# Patient Record
Sex: FEMALE | Race: WHITE
[De-identification: names, ages, dates, MRNs, and addresses within clinical notes are randomized per-mention and may not be internally consistent; named-entity substitution may affect disease eponyms.]

---

## 2018-11-28 NOTE — UC
Lower Extremity/Ankle HPI





- HPI Summary


HPI Summary: 





27 yo female presents with left 4th toe pain. She tells me that about 20min PTA 

she slipped in her kitchen at home and her toes wedged between the leg of her 

kitchen table. She had immediate pain and her left 4th toe is deformed. She has 

not taken anything for her discomfort. She is ambulatory with use of crutches.





- History of Current Complaint


Stated Complaint: TOE INJURY


Time Seen by Provider: 11/28/18 17:22


Hx Obtained From: Patient


Hx Last Menstrual Period: IUD


Severity Initially: Severe


Severity Currently: Severe


Pain Intensity: 10


Pain Scale Used: 0-10 Numeric





- Allergies/Home Medications


Allergies/Adverse Reactions: 


 Allergies











Allergy/AdvReac Type Severity Reaction Status Date / Time


 


erythromycin base Allergy  Hives Verified 09/02/18 08:22














PMH/Surg Hx/FS Hx/Imm Hx





- Additional Past Medical History


Additional PMH: 





None


Other History Of: 


   Negative For: HIV, Hepatitis B, Hepatitis C, Anticoagulant Therapy





- Surgical History


Surgical History: Yes


Surgery Procedure, Year, and Place: removal of ovary r/t cyst age 11.  c-

section x2





- Family History


Known Family History: Positive: Cardiac Disease - pt states father has arrythmia

, Diabetes


   Negative: Hypertension, Other - negative diverticulitis. 





- Social History


Occupation: Employed Full-time


Lives: With Family


Alcohol Use: None


Substance Use Type: None


Smoking Status (MU): Heavy Every Day Tobacco Smoker


Type: Cigarettes


Amount Used/How Often: 1/2 PPD


Have You Smoked in the Last Year: Yes





- Immunization History


Most Recent Influenza Vaccination: 03/2017





Review of Systems


All Other Systems Reviewed And Are Negative: Yes


Constitutional: Positive: Negative


Skin: Positive: Negative


Respiratory: Positive: Negative


Cardiovascular: Positive: Negative


Neurovascular: Positive: Negative


Musculoskeletal: Positive: Other: - left 4th toe injury


Neurological: Positive: Negative


Psychological: Positive: Negative





Physical Exam





- Summary


Physical Exam Summary: 





GENERAL: NAD. WDWN. No pain distress.


SKIN: No rashes, sores, lesions, or open wounds.


CHEST:  No accessory muscle use. Breathing comfortably and in no distress.


CV:  Pulses intact PT and DP. Cap refill <2seconds


MSK: LEFT 4th toe with obvious lateral angulation. Severe TTP about whole toe.


NEURO: Alert. Sensations intact and symmetric B/L LEs


PSYCH: Age appropriate behavior.


Triage Information Reviewed: Yes


Vital Signs: 





 Vital Signs (72 hours)











  11/28/18





  17:31


 


Temperature 100.2 F


 


Pulse Rate 89


 


Respiratory 18





Rate 


 


Blood Pressure 143/94





(mmHg) 


 


O2 Sat by Pulse 99





Oximetry 











Vital Signs Reviewed: Yes





Procedures





- Joint Reduction


  ** Left


Joint Reduction Site: other - 4th toe


Specify Other Joint Reduced: 4th toe


Conscious Sedation: No


Reduction Attempts: 1


Pre-Procedure NV Exam: Yes





Lower Extremity Course/Dx





- Course


Course Of Treatment: Pt was given norco in the clinic for her discomfort. XR: 

REPORT AND IMPRESSION:  #. Proximal metaphyseal fracture of the fourth proximal 

phalanx with approximate 30 degrees apex plantar angulation without significant 

displacement. Associated fusiform soft tissue swelling. Normal articular 

alignment.  Discussed with the pt that I wish to reduce her toe and logan tape 

it. She was agreeable to this. The procedure was explained to the pt and all 

questions were answered. A time out was performed, witnessed, and signed. The 

area was prepped with an alcohol pad. A 25g needle and 0.5mL 2% lidocaine 

without epi was administered as a digital block on the medial and lateral 

aspect of the 4th toe dorsal MTP and good anesthetization was achieved. 

Longitudinal traction was applied and the toe moved into correct anatomical 

alignment and logan taped to the 3rd toe. Pt tolerated well. She was fitted for 

a post-op shoe and advised to RICE, use her crutches, and f/u with Orthopedics.





- Differential Dx/Diagnosis


Provider Diagnosis: 


 Fracture of fourth toe, left, closed








Discharge





- Sign-Out/Discharge


Documenting (check all that apply): Patient Departure


All imaging exams completed and their final reports reviewed: Yes





- Discharge Plan


Condition: Stable


Disposition: HOME


Patient Education Materials:  Toe Fracture (ED)


Forms:  *Work Release


Referrals: 


Vick Gunn MD [Primary Care Provider] - 


Da Huerta MD [Medical Doctor] - As Soon As Possible


Additional Instructions: 


If you develop a fever, shortness of breath, chest pain, new or worsening 

symptoms - please call your PCP or go to the ED.


 





Your blood pressure was high at todays visit. Please see your primary provider 

within 4 weeks for recheck and re-evaluation.





1) Rest, Ice, and elevate your foot as much as possible


2) Use the post-op shoe and crutches to be non weight bearing


3) Please call Orthopedics at the number below to schedule a follow up 

appointment as soon as possible





- Billing Disposition and Condition


Condition: STABLE


Disposition: Home

## 2019-07-03 ENCOUNTER — HOSPITAL ENCOUNTER (EMERGENCY)
Dept: HOSPITAL 25 - ED | Age: 29
Discharge: HOME | End: 2019-07-03
Payer: COMMERCIAL

## 2019-07-03 VITALS — DIASTOLIC BLOOD PRESSURE: 66 MMHG | SYSTOLIC BLOOD PRESSURE: 119 MMHG

## 2019-07-03 DIAGNOSIS — F17.210: ICD-10-CM

## 2019-07-03 DIAGNOSIS — N93.8: Primary | ICD-10-CM

## 2019-07-03 DIAGNOSIS — R10.9: ICD-10-CM

## 2019-07-03 DIAGNOSIS — R11.0: ICD-10-CM

## 2019-07-03 LAB
ALBUMIN SERPL BCG-MCNC: 4.4 G/DL (ref 3.2–5.2)
ALBUMIN/GLOB SERPL: 1.5 {RATIO} (ref 1–3)
ALP SERPL-CCNC: 55 U/L (ref 34–104)
ALT SERPL W P-5'-P-CCNC: 15 U/L (ref 7–52)
ANION GAP SERPL CALC-SCNC: 7 MMOL/L (ref 2–11)
AST SERPL-CCNC: 15 U/L (ref 13–39)
BASOPHILS # BLD AUTO: 0 10^3/UL (ref 0–0.2)
BUN SERPL-MCNC: 15 MG/DL (ref 6–24)
BUN/CREAT SERPL: 16.9 (ref 8–20)
CALCIUM SERPL-MCNC: 9.5 MG/DL (ref 8.6–10.3)
CHLORIDE SERPL-SCNC: 106 MMOL/L (ref 101–111)
EOSINOPHIL # BLD AUTO: 0.4 10^3/UL (ref 0–0.6)
GLOBULIN SER CALC-MCNC: 2.9 G/DL (ref 2–4)
GLUCOSE SERPL-MCNC: 76 MG/DL (ref 70–100)
HCG SERPL QL: < 0.6 MIU/ML
HCO3 SERPL-SCNC: 25 MMOL/L (ref 22–32)
HCT VFR BLD AUTO: 41 % (ref 35–47)
HGB BLD-MCNC: 14 G/DL (ref 12–16)
LYMPHOCYTES # BLD AUTO: 3.5 10^3/UL (ref 1–4.8)
MCH RBC QN AUTO: 30 PG (ref 27–31)
MCHC RBC AUTO-ENTMCNC: 34 G/DL (ref 31–36)
MCV RBC AUTO: 87 FL (ref 80–97)
MONOCYTES # BLD AUTO: 0.7 10^3/UL (ref 0–0.8)
NEUTROPHILS # BLD AUTO: 4.8 10^3/UL (ref 1.5–7.7)
NRBC # BLD AUTO: 0 10^3/UL
NRBC BLD QL AUTO: 0.1
PLATELET # BLD AUTO: 262 10^3/UL (ref 150–450)
POTASSIUM SERPL-SCNC: 4.1 MMOL/L (ref 3.5–5)
PROT SERPL-MCNC: 7.3 G/DL (ref 6.4–8.9)
RBC # BLD AUTO: 4.75 10^6 /UL (ref 3.7–4.87)
SODIUM SERPL-SCNC: 138 MMOL/L (ref 135–145)
WBC # BLD AUTO: 9.5 10^3/UL (ref 3.5–10.8)

## 2019-07-03 PROCEDURE — 36415 COLL VENOUS BLD VENIPUNCTURE: CPT

## 2019-07-03 PROCEDURE — 86901 BLOOD TYPING SEROLOGIC RH(D): CPT

## 2019-07-03 PROCEDURE — 99282 EMERGENCY DEPT VISIT SF MDM: CPT

## 2019-07-03 PROCEDURE — 84702 CHORIONIC GONADOTROPIN TEST: CPT

## 2019-07-03 PROCEDURE — 86900 BLOOD TYPING SEROLOGIC ABO: CPT

## 2019-07-03 PROCEDURE — 85025 COMPLETE CBC W/AUTO DIFF WBC: CPT

## 2019-07-03 PROCEDURE — 80053 COMPREHEN METABOLIC PANEL: CPT

## 2019-07-03 PROCEDURE — 86850 RBC ANTIBODY SCREEN: CPT

## 2019-07-03 NOTE — ED
GI/ HPI





- HPI Summary


HPI Summary: 


29 year old  female presents with vaginal bleeding today.  She states 

that period was 8 days late.  states had this much bleeding when had 

miscarriage.  States she's been having heavy bleeding for the past day.  She 

admits to nausea but no vomiting.  No dizziness.  No palpitations.  states she 

is soaking a tampon every hour.  No urinary symptoms.  No fevers.  





- History of Current Complaint


Chief Complaint: EDVaginalBleeding


Time Seen by Provider: 19 22:41


Stated Complaint: HEAVY BLEEDING PER PT


Hx Last Menstrual Period: IUD


Pain Intensity: 8





- Allergy/Home Medications


Allergies/Adverse Reactions: 


 Allergies











Allergy/AdvReac Type Severity Reaction Status Date / Time


 


erythromycin base Allergy  Hives Verified 18 08:22














PMH/Surg Hx/FS Hx/Imm Hx


Endocrine/Hematology History: 


   Denies: Hx Anticoagulant Therapy, Hx Diabetes, Hx Thyroid Disease


Cardiovascular History: 


   Denies: Hx Congestive Heart Failure, Hx Deep Vein Thrombosis, Hx Hypertension

, Hx Myocardial Infarction, Hx Pacemaker/ICD


Respiratory History: Reports: Hx Asthma


   Denies: Hx Chronic Obstructive Pulmonary Disease (COPD), Hx Lung Cancer, Hx 

Pneumonia, Hx Pulmonary Embolism


GI History: 


   Denies: Hx Gall Bladder Disease, Hx Gastrointestinal Bleed, Hx Ulcer, Hx 

Urosepsis


 History: 


   Denies: Hx Kidney Stones, Hx Renal Disease


Neurological History: 


   Denies: Hx Dementia, Hx Migraine, Hx Seizures, Hx Transient Ischemic Attacks 

(TIA)


Psychiatric History: 


   Denies: Hx Anxiety, Hx Depression, Hx Schizophrenia, Hx Bipolar Disorder





- Surgical History


Surgery Procedure, Year, and Place: removal of ovary r/t cyst age 11.  c-

section x2


Infectious Disease History: No


Infectious Disease History: 


   Denies: Hx Clostridium Difficile, Hx Hepatitis, Hx Human Immunodeficiency 

Virus (HIV), Hx of Known/Suspected MRSA, Hx Shingles, Hx Tuberculosis, Hx Known/

Suspected VRE, Hx Known/Suspected VRSA, History Other Infectious Disease, 

Traveled Outside the [place] in Last 30 Days





- Family History


Known Family History: Positive: Cardiac Disease - pt states father has arrythmia

, Diabetes


   Negative: Hypertension, Other - negative diverticulitis. 





- Social History


Alcohol Use: None


Substance Use Type: Reports: None


Smoking Status (MU): Heavy Every Day Tobacco Smoker


Type: Cigarettes


Amount Used/How Often: 1/2 PPD


Have You Smoked in the Last Year: Yes





Review of Systems


Negative: Fever


Negative: Chest Pain


Negative: Shortness Of Breath


Positive: Abdominal Pain, Nausea.  Negative: Vomiting, Diarrhea


All Other Systems Reviewed And Are Negative: Yes





Physical Exam


Triage Information Reviewed: Yes


Vital Signs On Initial Exam: 


 Initial Vitals











Temp Pulse Resp BP Pulse Ox


 


 98 F   73   18   131/87   96 


 


 19 22:32  19 22:32  19 22:32  19 22:32  19 22:32











Vital Signs Reviewed: Yes


Appearance: Positive: Well-Appearing


Skin: Positive: Warm, Dry


Head/Face: Positive: Normal Head/Face Inspection


Eyes: Positive: Normal, Conjunctiva Clear


ENT: Positive: Pharynx normal


Respiratory/Lung Sounds: Positive: Clear to Auscultation, Breath Sounds Present


Cardiovascular: Positive: Normal, RRR


Abdomen Description: Positive: Soft, Other: - lower abd tenderness


Bowel Sounds: Positive: Present


Musculoskeletal: Positive: Normal


Neurological: Positive: Normal


Psychiatric: Positive: Normal





Diagnostics





- Vital Signs


 Vital Signs











  Temp Pulse Resp BP Pulse Ox


 


 19 22:32  98 F  73  18  131/87  96














- Laboratory


Lab Results: 


 Lab Results











  19 Range/Units





  22:47 22:47 


 


WBC  9.5   (3.5-10.8)  10^3/uL


 


RBC  4.75   (3.70-4.87)  10^6 /uL


 


Hgb  14.0   (12.0-16.0)  g/dL


 


Hct  41   (35-47)  %


 


MCV  87   (80-97)  fL


 


MCH  30   (27-31)  pg


 


MCHC  34   (31-36)  g/dL


 


RDW  13   (10-15)  %


 


Plt Count  262   (150-450)  10^3/uL


 


MPV  6.9 L   (7.4-10.4)  fL


 


Neut % (Auto)  50.7   %


 


Lymph % (Auto)  37.3   %


 


Mono % (Auto)  7.1   %


 


Eos % (Auto)  4.4   %


 


Baso % (Auto)  0.5   %


 


Absolute Neuts (auto)  4.8   (1.5-7.7)  10^3/ul


 


Absolute Lymphs (auto)  3.5   (1.0-4.8)  10^3/ul


 


Absolute Monos (auto)  0.7   (0-0.8)  10^3/ul


 


Absolute Eos (auto)  0.4   (0-0.6)  10^3/ul


 


Absolute Basos (auto)  0.0   (0-0.2)  10^3/ul


 


Absolute Nucleated RBC  0.0   10^3/ul


 


Nucleated RBC %  0.1   


 


Blood Type   A Positive  


 


Antibody Screen   Pending  











Result Diagrams: 


 19 22:47





 19 22:47


Lab Statement: Any lab studies that have been ordered have been reviewed, and 

results considered in the medical decision making process.





Re-Evaluation





- Re-Evaluation


  ** First Eval


Re-Evaluation Time: 23:26


Comment: discussed results





GIGU Course/Dx





- Course


Course Of Treatment: 29 year old  female presents with vaginal bleeding 

today.  She states that period was 8 days late.  states had this much bleeding 

when had miscarriage.  States she's been having heavy bleeding for the past 

day.  She admits to nausea but no vomiting.  No dizziness.  No palpitations.  

states she is soaking a tampon every hour.  No urinary symptoms.  No fevers.  

On exam has tenderness in pelvic area. wbc normal. h/h normal. hcg normal. 

discussed that patient is traying to conceive and just got off mirena so can 

have abnormal periods. will not restart birth control. patient understand and 

agrees with plan.





- Diagnoses


Differential Diagnoses - Female: Urinary Tract Infection, Other - pregnancy, 

miscarriage


Provider Diagnoses: 


 Dysfunctional uterine bleeding








Discharge





- Sign-Out/Discharge


Documenting (check all that apply): Patient Departure


Patient Received Moderate/Deep Sedation with Procedure: No





- Discharge Plan


Condition: Good


Disposition: HOME


Patient Education Materials:  Dysfunctional Uterine Bleeding (ED)


Referrals: 


Vick Gunn MD [Primary Care Provider] - 


Additional Instructions: 


take ibuprofen every 6 hours as needed for pain and bleeding


Return to ED if develop any new or worsening symptoms





- Billing Disposition and Condition


Condition: GOOD


Disposition: Home

## 2019-10-24 ENCOUNTER — HOSPITAL ENCOUNTER (EMERGENCY)
Dept: HOSPITAL 25 - UCEAST | Age: 29
Discharge: HOME | End: 2019-10-24
Payer: COMMERCIAL

## 2019-10-24 VITALS — SYSTOLIC BLOOD PRESSURE: 130 MMHG | DIASTOLIC BLOOD PRESSURE: 86 MMHG

## 2019-10-24 DIAGNOSIS — F17.210: ICD-10-CM

## 2019-10-24 DIAGNOSIS — G89.29: ICD-10-CM

## 2019-10-24 DIAGNOSIS — M25.551: ICD-10-CM

## 2019-10-24 DIAGNOSIS — M54.32: Primary | ICD-10-CM

## 2019-10-24 DIAGNOSIS — M25.552: ICD-10-CM

## 2019-10-24 DIAGNOSIS — Z88.1: ICD-10-CM

## 2019-10-24 DIAGNOSIS — M54.31: ICD-10-CM

## 2019-10-24 PROCEDURE — 96372 THER/PROPH/DIAG INJ SC/IM: CPT

## 2019-10-24 PROCEDURE — G0463 HOSPITAL OUTPT CLINIC VISIT: HCPCS

## 2019-10-24 PROCEDURE — 99211 OFF/OP EST MAY X REQ PHY/QHP: CPT

## 2019-10-24 NOTE — UC
Hip/Pelvis Pain





- HPI Summary


HPI Summary: 





29-year-old female who has bilateral hip pain the right is worse than the left 

with mild shooting down her leg.  She states she has had this for a couple of 

weeks and followed up with her primary care provider who did some hip x-rays 

however she does not know the results of those.  She has an appointment with 

her primary care provider on Monday.  She denies any saddle anesthesia and no 

numbness or tingling in her extremities.  She would like tomorrow off from 

work.  She has had no known injury and no recent illness.  She denies any 

urinary symptoms.





- History Of Current Complaint


Chief Complaint: UCLowerExtremity


Stated Complaint: hip PAIN


Time Seen by Provider: 10/24/19 19:07


Hx Obtained From: Patient


Hx Last Menstrual Period: 10/24/19


Pregnant?: No


Onset/Duration: Gradual Onset


Timing: Constant


Severity Initially: Mild


Severity Currently: Mild - Patient moves her legs without difficulty Exam table 

and is able to get up on the exam table without difficulty.


Pain Intensity: 9


Character Of Pain: Sharp


Aggravating Factor(s): Movement


Alleviating Factor(s): Rest


Associated Signs And Symptoms: Positive: Negative





- Allergies/Home Medications


Allergies/Adverse Reactions: 


 Allergies











Allergy/AdvReac Type Severity Reaction Status Date / Time


 


erythromycin base Allergy  Hives Verified 10/24/19 18:52











Home Medications: 


 Home Medications





Meloxicam [Mobic] 15 mg PO DAILY 10/24/19 [History Confirmed 10/24/19]











PMH/Surg Hx/FS Hx/Imm Hx


Previously Healthy: Yes


Other History Of: 


   Negative For: HIV, Hepatitis B, Hepatitis C, Anticoagulant Therapy





- Surgical History


Surgical History: Yes


Surgery Procedure, Year, and Place: removal of ovary r/t cyst age 11.  c-

section x2





- Family History


Known Family History: Positive: Cardiac Disease - pt states father has arrythmia

, Diabetes


   Negative: Hypertension, Other - negative diverticulitis. 





- Social History


Alcohol Use: None


Substance Use Type: None


Smoking Status (MU): Light Every Day Tobacco Smoker


Type: Cigarettes


Amount Used/How Often: 1/2 PPD


Have You Smoked in the Last Year: Yes


Household Exposure Type: Cigarettes





- Immunization History


Most Recent Influenza Vaccination: 03/2017





Review of Systems


All Other Systems Reviewed And Are Negative: Yes


Motor: Positive: Negative


Neurovascular: Positive: Negative


Musculoskeletal: Positive: Other: - Patient states she has some sharp shooting 

pain in both hips but more on the right which shoots down her leg a little bit.

  He denies any saddle anesthesia and denies any numbness or tingling in her 

extremities.


Neurological: Negative: Weakness, Paresthesia, Numbness


Is Patient Immunocompromised?: No





Physical Exam


Triage Information Reviewed: Yes


Appearance: Well-Appearing, No Pain Distress, Well-Nourished


Vital Signs: 


 Initial Vital Signs











Temp  98.7 F   10/24/19 18:45


 


Pulse  69   10/24/19 18:45


 


Resp  16   10/24/19 18:45


 


BP  130/86   10/24/19 18:45


 


Pulse Ox  97   10/24/19 18:45











Vital Signs Reviewed: Yes


Respiratory: Positive: Lungs clear, Normal breath sounds, No respiratory 

distress, No accessory muscle use


Cardiovascular: Positive: RRR, No Murmur, Pulses Normal, Brisk Capillary Refill


Abdomen Description: Positive: Nontender, No Organomegaly, Soft.  Negative: CVA 

Tenderness (R), CVA Tenderness (L), Hepatomegaly, Splenomegaly


Bowel Sounds: Positive: Present


Musculoskeletal Exam: Normal


Musculoskeletal: Positive: Other: - Patient has minimal pain on palpation 

around the right buttock area.  She has good range of motion of her extremities.


Neurological: Positive: Alert, Muscle Tone Normal, Other: - Good peripheral 

pulses neuro sensation capillary refill, reflexes +2 at the knee, negative 

straight leg raise playing, mildly positive straight leg raise right leg.


Psychological Exam: Normal


Skin Exam: Normal





Hip Injury Course/Dx





- Course


Course Of Treatment: 





The patient appears comfortable here and moves without difficulty.  She was 

given an injection of Toradol 30 mg IM for pain and she may continue Motrin 

every 8 hours alternating with Tylenol every 4 hours.  I did give her a note 

for no work until Sunday when she is next scheduled work.  He is to avoid 

movements that cause pain.





- Differential Dx/Diagnosis


Provider Diagnosis: 


 Sciatica, Hip pain, chronic








Discharge ED





- Sign-Out/Discharge


Documenting (check all that apply): Patient Departure


All imaging exams completed and their final reports reviewed: No Studies





- Discharge Plan


Condition: Good


Disposition: HOME


Patient Education Materials:  Sciatica (ED)


Forms:  *Work Release


Referrals: 


Vick Gunn MD [Primary Care Provider] - 


Additional Instructions: 


Continue your present medications.  You may take Tylenol every 4 hours as 

needed for pain.  May apply heat to the sore area.  Avoid movements that cause 

pain and avoid lifting.  Keep the appointment with her primary care provider on 

Monday as previously scheduled.





- Billing Disposition and Condition


Condition: GOOD


Disposition: Home





- Attestation Statements


Provider Attestation: 





This patient was not seen by me.  I was available for consult.  Chart reviewed. 

CRISTINA

## 2019-12-13 ENCOUNTER — HOSPITAL ENCOUNTER (EMERGENCY)
Dept: HOSPITAL 25 - UCEAST | Age: 29
Discharge: HOME | End: 2019-12-13
Payer: SELF-PAY

## 2019-12-13 VITALS — DIASTOLIC BLOOD PRESSURE: 91 MMHG | SYSTOLIC BLOOD PRESSURE: 137 MMHG
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
## 2019-12-13 DIAGNOSIS — Z88.1: ICD-10-CM

## 2019-12-13 DIAGNOSIS — M79.632: ICD-10-CM

## 2019-12-13 DIAGNOSIS — F17.210: ICD-10-CM
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
## 2019-12-13 DIAGNOSIS — S20.212A: Primary | ICD-10-CM

## 2019-12-13 DIAGNOSIS — J45.909: ICD-10-CM

## 2019-12-13 PROCEDURE — G0463 HOSPITAL OUTPT CLINIC VISIT: HCPCS

## 2019-12-13 PROCEDURE — 99212 OFFICE O/P EST SF 10 MIN: CPT

## 2019-12-13 PROCEDURE — 84702 CHORIONIC GONADOTROPIN TEST: CPT

## 2019-12-13 PROCEDURE — 71046 X-RAY EXAM CHEST 2 VIEWS: CPT
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
## 2019-12-14 NOTE — [facility]
- Progress Note


Progress Note: 





PROGRESS NOTE:


LAB RESULTS:


MDM:x-ray results show no arm fracture and no acute disease in the chest x-ray.

  No change in treatment.


Espinoza Helms MD








Course/Dx





- Diagnoses


Provider Diagnoses: 


 Chest wall contusion








Discharge ED





- Sign-Out/Discharge


Documenting (check all that apply): Post-Discharge Follow Up


All imaging exams completed and their final reports reviewed: Yes





- Discharge Plan


Condition: Stable


Disposition: HOME


Prescriptions: 


Naproxen [Naproxen 500 mg tab] 500 mg PO Q12HR PRN #30 tablet


 PRN Reason: Pain - Mild


Patient Education Materials:  Contusion in Adults (ED), Chest Wall Pain (ED)


Referrals: 


Vick Gunn MD [Primary Care Provider] - 3 Days


Additional Instructions: 


The chest x-ray performed in the clinic today was normal. The left forearm x-

ray showed no evidence of a fracture.





Take naproxen 500 mg 1 tab every 12 hours with food as needed for pain.





Apply ice to the affected area(s) for 15-20 minutes at least 4 times a day to 

help with the pain and swelling.





Rest the left arm as much as possible.





Elevate the arm to help reduce swelling.





Follow up with your primary care provider in 3-5 days if symptoms do not 

improve.





Seek immediate medical attention if you have severe pain not managed with pain 

medication, you have severe chest pain, difficulty breathing, develop numbness 

or tingling in the hand or fingers, or have any worsening of symptoms.





- Billing Disposition and Condition


Condition: STABLE


Disposition: Home